# Patient Record
Sex: MALE | Race: OTHER | URBAN - METROPOLITAN AREA
[De-identification: names, ages, dates, MRNs, and addresses within clinical notes are randomized per-mention and may not be internally consistent; named-entity substitution may affect disease eponyms.]

---

## 2022-06-27 NOTE — PATIENT DISCUSSION
[facility] monitoring, AREDS2 vitamins, no smoking, green leafy vegetables discussed.

## 2022-10-28 NOTE — PATIENT DISCUSSION
[facility] monitoring, AREDS2 vitamins, no smoking, green leafy vegetables discussed.

## 2024-08-26 ENCOUNTER — COMPREHENSIVE EXAM (OUTPATIENT)
Dept: URBAN - METROPOLITAN AREA CLINIC 16 | Facility: CLINIC | Age: 44
End: 2024-08-26

## 2024-08-26 DIAGNOSIS — Z01.00: ICD-10-CM

## 2024-08-26 DIAGNOSIS — H52.223: ICD-10-CM

## 2024-08-26 DIAGNOSIS — H52.13: ICD-10-CM

## 2024-08-26 PROCEDURE — 92004 COMPRE OPH EXAM NEW PT 1/>: CPT

## 2024-08-26 PROCEDURE — 92015 DETERMINE REFRACTIVE STATE: CPT

## 2024-08-26 PROCEDURE — 92310C CONTACT LENS 75

## 2024-08-26 ASSESSMENT — TONOMETRY
OD_IOP_MMHG: 13
OS_IOP_MMHG: 12

## 2024-08-26 ASSESSMENT — KERATOMETRY
OD_K2POWER_DIOPTERS: 44.75
OS_AXISANGLE_DEGREES: 7
OS_K1POWER_DIOPTERS: 44.50
OD_K1POWER_DIOPTERS: 44.25
OS_K2POWER_DIOPTERS: 44.75
OS_AXISANGLE2_DEGREES: 97
OD_AXISANGLE_DEGREES: 155
OD_AXISANGLE2_DEGREES: 65

## 2024-11-15 ENCOUNTER — EMERGENCY VISIT (OUTPATIENT)
Dept: URBAN - METROPOLITAN AREA CLINIC 14 | Facility: CLINIC | Age: 44
End: 2024-11-15

## 2024-11-15 DIAGNOSIS — H16.001: ICD-10-CM

## 2024-11-15 PROCEDURE — 99213 OFFICE O/P EST LOW 20 MIN: CPT

## 2024-11-18 ENCOUNTER — FOLLOW UP (OUTPATIENT)
Dept: URBAN - METROPOLITAN AREA CLINIC 14 | Facility: CLINIC | Age: 44
End: 2024-11-18

## 2024-11-18 DIAGNOSIS — H16.001: ICD-10-CM

## 2024-11-18 PROCEDURE — 99213 OFFICE O/P EST LOW 20 MIN: CPT
